# Patient Record
Sex: FEMALE
[De-identification: names, ages, dates, MRNs, and addresses within clinical notes are randomized per-mention and may not be internally consistent; named-entity substitution may affect disease eponyms.]

---

## 2018-04-08 PROCEDURE — 30233N1 TRANSFUSION OF NONAUTOLOGOUS RED BLOOD CELLS INTO PERIPHERAL VEIN, PERCUTANEOUS APPROACH: ICD-10-PCS | Performed by: FAMILY MEDICINE

## 2018-04-09 ENCOUNTER — HOSPITAL ENCOUNTER (INPATIENT)
Dept: HOSPITAL 95 - ER | Age: 78
LOS: 1 days | Discharge: ANOTHER HOSPITAL | DRG: 683 | End: 2018-04-10
Attending: INTERNAL MEDICINE | Admitting: INTERNAL MEDICINE
Payer: OTHER GOVERNMENT

## 2018-04-09 VITALS — WEIGHT: 166.01 LBS | BODY MASS INDEX: 28.34 KG/M2 | HEIGHT: 64.02 IN

## 2018-04-09 DIAGNOSIS — E87.5: ICD-10-CM

## 2018-04-09 DIAGNOSIS — E79.0: ICD-10-CM

## 2018-04-09 DIAGNOSIS — Z66: ICD-10-CM

## 2018-04-09 DIAGNOSIS — D70.1: ICD-10-CM

## 2018-04-09 DIAGNOSIS — C91.10: ICD-10-CM

## 2018-04-09 DIAGNOSIS — E88.3: Primary | ICD-10-CM

## 2018-04-09 DIAGNOSIS — D63.0: ICD-10-CM

## 2018-04-09 LAB
BASOPHILS # BLD: 0 K/MM3 (ref 0–0.23)
BASOPHILS NFR BLD: 0 % (ref 0–2)
CK MB CFR SERPL CALC: 1 % (ref 0–4)
CK SERPL-CCNC: 79 U/L (ref 26–193)
DEPRECATED RDW RBC AUTO: 70.3 FL (ref 35.1–46.3)
EOSINOPHIL # BLD: 0 K/MM3 (ref 0–0.68)
EOSINOPHIL NFR BLD: 0 % (ref 0–6)
ERYTHROCYTE [DISTWIDTH] IN BLOOD BY AUTOMATED COUNT: 21.3 % (ref 11.7–14.2)
HCT VFR BLD AUTO: 14.1 % (ref 33–51)
HGB BLD-MCNC: 4.4 G/DL (ref 11.5–16)
LDH SERPL-CCNC: 319 U/L (ref 100–240)
LYMPHOCYTES # BLD: 0.83 K/MM3 (ref 0.84–5.2)
LYMPHOCYTES NFR BLD: 66 % (ref 21–46)
MCHC RBC AUTO-ENTMCNC: 31.2 G/DL (ref 31.5–36.5)
MCV RBC AUTO: 90 FL (ref 80–100)
MONOCYTES # BLD: 0.03 K/MM3 (ref 0.16–1.47)
MONOCYTES NFR BLD: 3 % (ref 4–13)
NEUTS SEG # BLD MANUAL: 0.39 K/MM3 (ref 1.96–9.15)
NEUTS SEG NFR BLD MANUAL: 31 % (ref 41–73)
NRBC # BLD AUTO: 0 K/MM3 (ref 0–0.02)
NRBC BLD AUTO-RTO: 0 /100 WBC (ref 0–0.2)
PLATELET # BLD AUTO: 13 K/MM3 (ref 150–400)
PROTHROMBIN TIME: 11.3 SEC (ref 9.7–11.5)
TOTAL CELLS COUNTED BLD: 100
TROPONIN I SERPL-MCNC: <0.015 NG/ML (ref 0–0.04)
URATE SERPL-MCNC: 41.8 MG/DL (ref 2.6–6)

## 2018-04-09 PROCEDURE — P9016 RBC LEUKOCYTES REDUCED: HCPCS

## 2018-04-09 PROCEDURE — C9113 INJ PANTOPRAZOLE SODIUM, VIA: HCPCS

## 2018-04-10 LAB
ANION GAP SERPL CALCULATED.4IONS-SCNC: 16 MMOL/L (ref 6–16)
BUN SERPL-MCNC: 131 MG/DL (ref 8–24)
CA-I BLD-SCNC: 0.9 MMOL/L (ref 1.1–1.46)
CALCIUM SERPL-MCNC: 6.3 MG/DL (ref 8.5–10.1)
CHLORIDE BLD-SCNC: 103 MMOL/L (ref 98–108)
CHLORIDE SERPL-SCNC: 104 MMOL/L (ref 98–108)
CO2 BLD-SCNC: 14 MMOL/L (ref 21–32)
CO2 SERPL-SCNC: 12 MMOL/L (ref 21–32)
CREAT BLD-MCNC: 8.1 MG/DL (ref 0.6–1)
CREAT SERPL-MCNC: 6.83 MG/DL (ref 0.4–1)
GLUCOSE BLDC GLUCOMTR-MCNC: 107 MG/DL (ref 70–99)
GLUCOSE SERPL-MCNC: 197 MG/DL (ref 70–99)
HCT VFR BLD CALC: < 15 % (ref 36–46)
HGB BLD CALC-MCNC: (no result) G/DL (ref 12–16)
POTASSIUM BLD-SCNC: 6.1 MMOL/L (ref 3.5–5.5)
POTASSIUM SERPL-SCNC: 5.9 MMOL/L (ref 3.5–5.5)
SODIUM BLD-SCNC: 132 MMOL/L (ref 135–148)
SODIUM SERPL-SCNC: 132 MMOL/L (ref 136–145)

## 2021-05-15 NOTE — NUR
PHYSICIAN COMMUNICATION
CONTACTED ON CALL PHYSICIAN TO NOTIFY HIM THAT THE PATIENT HAD CRITICALLY LOW
HEMEGLOBIN OF 5.1 AND HEMATOCRIT OF 16.5. DR LANCASTER ORDERED ONE UNIT OF RED
BLOOD CELLS. ALSO NOTIFIED HIM THAT HER PLATELET COUNT IS 36. NO ORDERS GIVEN
FOR THIS.

## 2021-05-15 NOTE — NUR
SHIFT SUMMARY
PATIENT ALERT AND ORIENTED. HAD NO COMPLAINTS OF PAIN OR SHORTNESS OF BREATH.
SLEPT WELL OVERNIGHT AND HAD MINIMAL NEEDS. IV PATENT AND INFUSING. BED IN
LOWEST POSITION WITH WHEELS LOCKED AND ALARM ON. CALL LIGHT WITHIN REACH.
REPORT GIVEN TO ONCOMING RN.

## 2021-05-15 NOTE — NUR
PT AOX4 AND COOPERATIVE OF CARE. PT HAS BEEN TREATED FOR LOW GRADE FEVER AND
IS A ONE PERSON ASSIST TO RESTROOM OR BEDSIDE COMMODE. PT HAS HAD DIRRHEA AND
SOME INCONTENCE PULL UPS IN PLACE. 1 UNIT OF BLOOD ADMINISTERED TO DAY AND PT
TOLERATED WELL. CALL LIGHT IS WITHIN REACH WILL CONTINUE TO MONITOR.

## 2021-05-16 NOTE — NUR
SHIFT SUMMARY
PATIENT HAD NO ACUTE CHANGES OBSERVED. AXOX 3 AND ONE ASSIST TO BSC. ON 4L O2
NC. VSS/AFEBRILE. DENIES PAIN, SOB, AND N/V. PIV REMAINS INTACT. TEMPERATURE
REDUCED TO 97.3 FROM 100.3 ON DAY SHIFT. DROPLET PRECAUTIONS. CALL LIGHT IN
REACH. BED IN LOWEST POSITION. WILL CONTINUE TO MONITOR UNTIL DAY SHIFT NURSE
ASSUMES CARE.

## 2021-05-16 NOTE — NUR
PT AOX4 AND COOPERATIVE OF CARE. PT HAS HAD MILD FEVER AND HAS BEEN TREATED
PER EMAR. PT HAD DIARHEA STILL AT START OF SHIFT AND DR REDDY NOTIFIED AND
ADDED MEDICATION TO EMAR. PT CALLS APPROPRIATELY AND IS A STANDBY TO BEDSIDE
COMMODE. PT VERY TIRED AND IS RESTING IN BED AT THIS TIME. CALL LIGHT IS
WITHIN REACH WILL CONTINUE TO MONITOR.

## 2021-05-17 NOTE — NUR
SHIFT SUMMARY
AOX4. SPO2 DROPS WHEN NC TAKEN OFF. FOUND NC OFF 2X & SPO2 @74%, DENIES SOB.
PLACED NC & SPO2 INCREASED TO 88%, HAD PT PRONE & INCREASED O2 TO 5L, SPO2
@93%. HAS FREQUENT DRY NONPRODUCTIVE COUGH. LUNGS DIM IN BASES. REST OF VITALS
STABLE. REPORTS 8/10 ACHY PAIN ALLOVER BODY & IN JOINTS, MEDICATED 1X c
TYLENOL. DENIES N/V. NO DIARRHEA THIS SHIFT. IND TO BSC. CALL LIGHT IN REACH.
PT ABLE TO MAKE NEEDS KNOWN. WILL MONITOR.

## 2021-05-17 NOTE — NUR
PT AOX4 AND COOPERATIVE OF CARE. PT IS VERY MUCH IMPROVED FROM THE LAST TWO
DAYS. PT STILL NEEDS TO USE BEDSIDE COMMODE,BUT IS FEELING STRONGER AND WAS
ABLE TO EAT BETTER AND STATED SHE FELT BETTER. PT USES CALL LIGHT
APPROPRIATELY. PT STEADIER ON HER FEET TODAY. O2 HAS BEEN STABLE AROUND 95% ON
4L. WILL CONTINUE TO MONITOR.

## 2021-05-18 NOTE — NUR
ATTEMPTED TO ALERT  OF TRENDING DOWNWARD PLATELET NOW 46 W/HGB NOW
6.9. HE DIDN'T RETURN CALL PRIOR TO RN SHIFT REPORT SO DAY RN MADE AWARE AND
CHARGE RN, KRISTIAN SHARIF ALERTED AS WELL. WILL ENSURE DAY RN FOLLOWS UP.

## 2021-05-18 NOTE — NUR
SUMMARY: PT A/OX4, COOPERATIVE W/CARE AND CALLS APPROPRIATELY FOR ASSIST. SHE
OCCASSIONALLY YELLS INTO ANDERSON, MOANS AND GROANS BUT DENIES PAIN AND CLAIMS
"IT MAKES HER FEEL BETTER TO DO SO". SHE REMAINS DIAPHORETIC BUT AFEBRILE AND
CONT'S TO HAVE DIARRHEA. [place] [facility] [place] SHE ADMITS TO IMPROVED
BALANCE AND BEING "STEADIER ON FEET". SHE REMAINS ON 4L HUMIDIFIED O2 W/CONT
BIOX INTACT AND SPO2 WNL, RESPS E/U. PT SL BWTN IV ABX AND WILL RECIEVE DOSE
4 OF REMDESIVIR TODAY. NO ACUTE CHANGES, VSS/AFEBRILE. WCTM/REPORT TO DAY RN.

## 2021-05-18 NOTE — NUR
PT HAS RESTED THIS SHIFT. O2 REQUIREMENTS HAVE REMAINED THE SAME AT 5 L. SHE
IS INDEPENDENT IN THE ROOM AND AMBULATES TO THE RESTROOM AS NEEDED. SHE WAS
ABLE TO GET IN SHOWER THIS SHIFT WITH STAFF PRESENT IN ROOM AND ASSISTING AS
NEEDED. NO COMPLAINTS THIS TIME, CALL LIGHT WITHIN REACH.

## 2021-05-19 NOTE — NUR
SUMMARY: PT A/OX4, CALLS APPROPRIATELY AND SPECIFIES NEEDS. SHE REMAINS ON 5L
O2 W/SPO2 WNL AND RESPS E/U, MILD SOB NOTED W/EXERTION. SHE'S INDEPENDENT TO
BSC AND DIDN'T HAVE ANY EPISODES DIARRHEA THIS SHIFT. PT SL AFTER IV ABX. NO
PAIN, FEVERS OR COMPLAINTS AND SLEPT MAJORITY OF NOCTE. VSS/AFEBRILE, NO ACUTE
CHANGES. WCTM AND REPORT TO DAY RN.

## 2021-05-19 NOTE — NUR
SHIFT SUMMARY
 
PATIENT ALERT AND ORIENTATED X4. PATIENT CALLS APPROPRIATELY FOR SPECIFIED
NEEDS. PATIENT REMAINS ON 5L 02. PATIENT SATTING IN THE LOW TO MID 90S ON 5L.
PATIENT DOES NOT COMPLAIN OF PAIN, NAUSEA, FEVER. PATIENT IS INDEPENDENT IN
ROOM. PATIENT STATES NO EPISODES OF DIARRHEA AND BOWEL MOVEMENTS ARE
FORMING. PATIENT COOPERATIVE WITH CARE. VITAL SIGNS REVIEWED. NO ACUTE CHANGES
DURING SHIFT.

## 2021-05-19 NOTE — NUR
NURSING STUDENT DOC REVIEW
THIS RN ASSESSED THE PT. THIS RN REVIEWED & AGREES WITH NURSING STUDENT'S
ASSESSMENT AND DOCUMENTATION FOR THE SHIFT.

## 2021-05-20 NOTE — NUR
NURSING STUDENT DOC REVIEW
THIS RN ASSESSED THE PT. THIS RN REVIEWED & AGREES WITH THE STUDENT NURSE'S
DOCUMENTATION FOR THIS SHIFT.

## 2021-05-20 NOTE — NUR
SHIFT SUMMARY
 
PT IS A/0X4. PATIENT CALLS APPROPRIATELY AND STATES NEEDS. PATIENT IS
INDEPENDENT IN ROOM. PATIENT IS STILL SATTING IN THE LOW 90S ON 5L 02. PATIENT
HAD MODERATE BOWEL MOVEMENT IN COMMODE, NO DIARREA DURING THIS SHIFT. PATIENT
HAS HAD IV REMOVED. NO COMPLAINTS OF PAIN, FEVER. NO ACUTE CHANGES DURING
SHIFT. VITAL SIGNS REVIEWED. CALL LIGHT IN REACH.

## 2021-05-20 NOTE — NUR
PT HAD BEEN INCREASED TO 11 L O2 VIA OXYMIZER AROUND 2100. PT DENIED SOB BUT
WAS DESATTING IN THE LOW 80'S. DENIES DIZZINIESS.  RT CAME AND EVALUATED PT.
CURRENTLY ASLEEP SATTING AT 99% WITH CONTINOUS OX IN PLACE. CALL LIGHT WITHIN
REACH.

## 2021-05-21 NOTE — NUR
SHIFT SUMMARY
PT IS A&O AND CALLS APPROPRIATELY.  PT O2 SATURATIONS RANGES FROM 89 TO 97%
DEPENDING ON PT LEVEL OF EXERTION.  WHEN PT GETS OUT OF BED TO USE BSC 15L IN
A NON-REBREATHER USED CONCURENTLY TO MAINTIN PT SATURATIONS.  PT DENIED P/N/V
DURING SHIFT.  CNA STATED THAT PT BECAME AGITATED WHEN HOSPICE INFORMATION WAS
ON HER MEAL TRAY.  PT CURENTLY RESTING AND HOPING TO GET SOME SLEEP TONIGHT.
CALL LIGHT W/IN REACH.

## 2021-05-21 NOTE — NUR
NIGHT SHIFT SUMMARY
 PT A/O X4. PT HAS INCREASED FROM 5L O2 VIA NC AT BEGINNING OF SHIFT TO BEING
ON 15L VIA HIGH FLOW. PT USES BSC AND DESATS TO THE MID 80'S AFTER GETTING UP.
IT TAKES ABOUT 10 MIN FOR SATS TO COME BACK UP. RT MADE KNOWN AND RECOMMENDED
TO PUT A NON-REBREATHER MASK OVER THE HIGH FLOW O2 WHILE PT IS UP IN ORDER TO
HELP SATS INCREASE QUICKER. PT IS IN BED AND CURRENTLY SATTING IN THE LOW TO
MID 90'S. PT DENIED SOB OVERNIGHT EVEN WITH O2 DESATS. DENIES PAIN, NAUSEA,
DIZZINIESS. PT HAS BEEN ANNOYED AND THINKS OXYGEN EQUIPMENT IS MALFUNCTIONING.
PT STATES SHE COULD NOT TOLERATE BEING PRONE. CALL LIGHT WITHIN REACH, WILL
CONTINUE TO MONITOR.

## 2021-05-21 NOTE — NUR
HIGH FLOW
2300 PT GOT UP TO USE BSC AND DESATTED TO THE LOW TO MID 80'S. SAT HAS NOT
IMPROVED DESIPITE BEING BACK IN BED FOR SOME TIME. PT WILL NOT TOLERATE BEING
PRONE.  PLACED ON HIGH FLOW AND INCREASED TO 15L SATTING IN THE LOW 90'S.
WILL CONTINUE TO MONITOR.

## 2021-05-22 NOTE — NUR
Reshma has slept well overnight.  No complaints of pain or discomfort.  Still
quite sob with very minimal exertion.  Respiratory care decreased her high
flow as she was satting high 90's most of the night, However, 02 needs
increased once she fell asleep and oxygen was again turned up to 15 liters.
Large BM tonight on the commode.

## 2021-05-22 NOTE — NUR
SHIFT SUMMARY
PT A&O AND ABLE TO MAKE NEEDS KNOWN. IN MORING HELPED PT TO BSC, PT
SATURATIONS DROP IN TO HIGH 70'S LOW 80'S AND TOOK OVER 10MINUTES TO RECOVER.
PT REMAINED ASYMPTOMATIC DURING THAT TIME.  NEW IV WAS STARTED BY GERMAINE KRISHNAN
AND BLOOD TRANSFUSION ADMINISTERED.  IN AFTERNOON PT O2 SATURATIONS REMAINED
IN HIGH 90'S AND TITRATED NC DOWN TO 13L, PT LEVELS REMAIN STABLE.  PT
REQUESTED VISIT FROM PASTORAL STAFF, THEY ARE NOT HERE IN AFTERNOON WILL PASS
REQUEST ON TO NIGHT SHIFT.  PT CURENTLY EATING DINNER, CALL LIGHT W/IN REACH.

## 2021-05-22 NOTE — NUR
Patient continuous 02 sat monitor began consistantly alarming for saturations
in the mid 80's.  She stated she had been sitting up in bed scratching her
leg and that she didn't feel SOB.  Resp therapy consulted, and it was
recommended that we increase her high flow O2 back to 15 LPM which was done.
Reshma is satting in mid 90's at this time.

## 2021-05-23 NOTE — NUR
Shift Summary
A/Ox3, pleasant and cooperative. Up to bedside commode with 1p SBA. Titrated
O2 down, currently on 9L O2 via HF NC. Sats > 95% via biox. Denies shortness
of breath. Appetite is good eating at least half of all meals and also asking
for snacks in between meals. Patient refuses to accept positive stool occult
result and asking for a repeat. Dr. Rodriguez is aware. Patient optimistic about
improving so she can "go back to my people and do my duty" (cooking and
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 Ordered Ocean Spray for dry nose. Humidifier
attached to NC to aid in moisturizing nasal passage. Denies pain, nausea,
vomiting. Lung sounds are clear. WCTM.

## 2021-05-23 NOTE — NUR
NIGHT SHIFT SUMMARY
This patient had no complaints of discomfort overnight.  02 decreased to
11 liters via high flow at rest with saturations in low to mid 90's.  Patient
remains alert and oriented X4, pleasant and cooperative with care.
Stool was sent to lab for guiac.

## 2021-05-23 NOTE — NUR
OCEAN SPRAY
C/O DRY NOSE D/T OXYGEN SUPPLEMENT VIA HF NC @ 10-11 LPM. V.O. FROM DR. BARRERA
TO GIVEN OCEAN SPRAY Q1H PRN FOR DRY NOSE. EMAR UPDATED.

## 2021-05-24 NOTE — NUR
NIGHT SHIFT SUMMARY
Reshma had another night of improvement in her 02 saturation.  She is currently
down to 7L 02 via high flow from 9L at the beginning of the shift.  No
complaints of discomfort and she was able to get oob to bedside commode
independently without requiring the non rebreather mask.  Each time she would
get up, it would drop her Sats down to mid 80's and take approximately 10
minutes to recover and be consistantly in the 90-95% range.
Hoping to increase her endurance to make it to the "Actual bathroom" by mid
week.

## 2021-05-24 NOTE — NUR
assumed care of pt following receiving report from previous RN Elmer. Pt a/o x
4, pleasant/cooperative, sitting up in bed watching TV. Denies pain

## 2021-05-25 NOTE — NUR
SHIFT SUMMARY
ASSUMED CARE OF PT AT 1900. PT IS A/OX4. HEART SOUNDS REGULAR, LUNG SOUNDS
HAVE CRACKLES AT THE BASES. P IS ON 6L NC. SATURATIONS REMAIN ABOVE 90%. PT IS
INDEPENDENT TO BSC. PT HAS NO NEW COMPLAINTS.
 
CALL LIGHT IN REACH, BED IN LOWEST POSITON.

## 2021-05-25 NOTE — NUR
SHIFT SUMMARY:
NO ACUTE CHANGES TO REPORT THIS SHIFT. PT A&O; CALM AND COOPERATIVE WITH CARE;
INDEPENDENT IN ROOM. MEDICATED FOR SHOULDER PAIN PER EMAR. O2 @ 7L HIGH FLOW;
WIRELESS OXIMETRY IN PLACE. STEROIDS CONTINUING. REPORT GIVEN TO ONCOMING RN.

## 2021-05-25 NOTE — NUR
Patient is lying bed and alert. Patient tells me that she was hoping a
 would visit her today. She then asks if I would say a prayer for her.
I gladly provide prayer. The patient then talks about how silvino is at work to
harm God's people but settles on the thought that she knows that the Bible
tells us that we will be victorious in the end. I will continue to remain
available to patient and family.

## 2021-05-26 NOTE — NUR
NIGHT SHIFT SUMMARY
 PT A/O X4. SLEPT WELL OVERNIGHT. PT STARTED THE SHIFT ON 8-9L AND HAS BEEN
TITRATED DOWN TO 5L VIA HIGH FLOW SATTING IN THE HIGH 90'S. PT DENIES SOB.
SLIGHTLY ELEVATED TEMP THIS MORNING. DENIES CHILLS. BLANKET TAKEN OFF, COOL
WASHCLOTH APPLIED TO FOREHEAD. PT STATES SHE FEELS MUCH BETTER THIS AM AND
"READY TO WALK OUT OF HERE". DENIES PAIN, DIZZNIESS. INDEPENDENT TO BSC. USES
CALL LIGHT APPROPRIATELY.

## 2021-05-26 NOTE — NUR
PT IS A/O X4, CONTINENT, INDEPENDENT IN ROOM, USES BSC, AND CALLS
APPROPRIATELY. PT HAD A SLIGHTLY ELEVATED TEMP THIS AM BUT SHORTLY AFTER
MEDICATION THE FEVER BROKE. THE PT DENIES SOB, BUT DOES COUGH WITH DEEP
INSPIRATION. THE PT WAS WEANED OFF 5L OF HIGH FLOW O2 DOWN TO 1L THIS PM, SATS
ARE IN THE HIGH 90'S. THE PLAN IS TO D/C THE PT HOME TOMORROW.

## 2021-05-27 NOTE — NUR
HGB THIS AM WAS 6.7. PLT ALSO CONTINUED TO BE CRITICALLY LOW AT 45. NOTFIED
DR. BRANDT. NEW ORDERS TO TRANSFUSE 1 UNIT OF PRBC'S.

## 2021-05-27 NOTE — NUR
SHIFT SUMMARY
PT SLEPT WELL THIS EVENING. O2 REQUIREMENTS RANGING FROM 1-3 L. PT MOSTLY
REMAINED ON 1.5 L THROUGHOUT THE NIGHT. PT'S TEMPERATURE 99.7 AT START OF
SHIFT BUT DECREASED TO 98.9 THIS AM WITHOUT MEDICATION. NO COMPLAINTS OF PAIN
OR DISCOMFORT FROM PT. NO ACUTE CHANGES THIS SHIFT. VITAL SIGNS STABLE. PT
HOPEFUL TO DISCHARGE HOME TODAY.

## 2021-05-27 NOTE — NUR
PATIENT A/OX4, UP INDEPENDENTLY IN ROOM. FEVER THIS AFTERNOON .5,
MEDICATED WITH TYLENOL AND IS NOW WNL. 1 UNIT OF BLOOD GIVEN THIS AM FOR HGB
6.7. 2LO2 VIA NC THROUGHOUT THE SHIFT, LUNGS CLEAR. CONTINUES TO HAVE A COUGH
WITH SCANT AMOUNT OF CLEAR SPUTUM. 20G IV TO R FA WNL AND SL. PLANS TO DC TO
HOME WHEN STABLE.

## 2021-05-28 NOTE — NUR
SHIFT SUMMARY
PT A/O X4; PLEASANT AND COOPERATIVE WITH CARE. NO ACUTE CHANGES THIS SHIFT.
BEEING SLEEPING ON AND OFF ALL SHIFT. CURRENTLY SATTING IN THE LOW TO MID 90'S
WHILE ON 3.5 LITERS O2. WILL POSSIBLY DC TOMORROW AFTER HAVING REPEAT LABS IN
THE AM. VSS.

## 2021-05-28 NOTE — NUR
SHIFT SUMMARY
PT HAD AN UNEVENTFUL NIGHT. SLEPT OFF AND ON THROUGHOUT THE NIGHT. DENIES ANY
SOB. PT ON 2-3 L VIA NC WITH O2 SATS IN THE LOW 90'S. VITAL SIGNS STABLE. PT
REMAINED AFEBRILE. PT HOPEFUL TO DISCHARGE HOME TODAY.

## 2021-05-29 NOTE — NUR
SHIFT SUMMARY
PATIENT HAD NO ACUTE CHANGES OBSERVED. AXOX 4 AND INDEPENDENT IN ROOM. ON 3.5L
O2 NC STATING LOW TO MID 90'S IN CONTINUOUS PULSE OXIMETRY, DENIES PAIN, SOB,
AND N/V. Knik. PIV REMAINS INTACT. DROPLET PRECAUTIONS. CALL LIGHT IN REACH.
BED IN LOWEST POSITION. WILL CONTINUE TO MONITOR UNTIL DAY SHIFT NURSE ASSUMES
CARE.

## 2021-05-29 NOTE — NUR
SHIFT SUMMARY
NO ACUTE CHANGES TO PRESENT THIS SHIFT. PT UP INDEPENDENTLY AROUND RM AND TO
BTHRM. DR BELLAMY IN TO SEE PT THIS AM. PT DECLINING TO GO HOME. REPORTS THAT
SHE LIVES ALONE AND UNSURE IF SHE CAN MANAGE BY HERSELF YET. PT TO HAVE HOME
O2 EVAL PRIOR TO D/C. NO C/O PAIN OR SOB. REMAINS ON 2.5L O2, WITH SATS IN MID
90"S. CALL LT IN REACH. ABLE TO MAKE NEEDS KNOWN.

## 2021-05-30 NOTE — NUR
SHIFT SUMMARY
PATIENT REPORTED HA X ONE AND TYLENOL 650 MG GIVEN PER EMAR. STATING INTO THE
LOW 80'S ON 2.5 L O2 AND NOW 4.5L O2 NC AND STATING LOW TO MID 90'S. ON
CONTINUOUS PULSE OXIMETRY. AXOX 4 AND INDEPENDENT IN ROOM. VSS/AFEBRILE.
DENIES SOB AND N/V. DROPLET PRECAUTIONS. CALL LIGHT IN REACH. BED IN LOWEST
POSITION. WILL CONTINUE TO MONITOR UNTIL DAY SHIFT NURSE ASSUMES CARE.

## 2021-05-30 NOTE — NUR
SHIFT SUMMARY:
 
PT CURRENTLY USING 3L O2 VIA NC. PLAN IS FOR HOME DC LIKELY TUESDAY WITH HOME
HEALTH, WHEN HOME HELATH IS AVAILABLE TO COME ASSIST SAME DAY. PT IS
INDEPENDENT IN ROOM. DENIES ANY NEEDS OR CONCERNS AT THIS TIME.

## 2021-05-31 NOTE — NUR
SHIFT SUMMARY
 
PT IS AOX4. PT DENIES PAIN, N/V, SOB. PT IS ONE PERSON ASSIST TO SBA IN ROOM.
PT EXHIBITED EPISODE OF DESATURATION THIS AM AND WAS BUMPED TO 11 L 02 AND
PRONED-SEE NOTE. PT IS NOW BACK AT 6 L 02 VIA NC AND HUMIDIFIER. PT RECEIVED
ONE UNIT PRBC PER ORDERS. PT HAD A CHEST XRAY THIS JANNET. PT'S APPETITE IS GOOD.
PLAN IS FOR POTENTIAL DC TOMORROW ON HOSPICE IF PT IS AMENABLE TO THIS. PT DID
NOT HAVE VISITORS PER COVID PROTOCOL. ENHANCED ISOLATION PRECUATIONS
MAINTAINED T/O SHIFT. PT IS IN ROOM, CALL LIGHT IN REACH, BED IN LOW POSITION.

## 2021-05-31 NOTE — NUR
SHIFT SUMMARY
PATIENT HAD NO ACUTE CHANGES OBSERVED. AXOX 4 AND INDEPENDENT IN ROOM. ON 5L
O2 NC STATING 88%-97; DROPPING DOWN TO 84% ON 3.5-4L. ON CONTINUOUS
PULSE OXIMETRY. DENIES PAIN, SOB, AND N/V. VSS/AFEBRILE. DROPLET PRECAUTIONS.
PIV REMAINS INTACT. CALL LIGHT IN REACH. BED IN LOWEST POSITION. WILL CONTINUE
TO MONITOR UNTIL DAY SHIFT NURSE ASSUMES CARE.

## 2021-05-31 NOTE — NUR
PT OXYGEN STATUS
 
PT O2 DROPPED TO 82% ON 6 L 02 THIS AM. PT WAS LAYING IN BED. THIS RN BUMPED
02 TO 7 L AND SATS WERE 84-87%. PT INSTRUCTED TO PRONE AND INITIALLY REFUSED.
THIS RN EXPLAINED REASONING AND PT EVENTUALLY PRONED FOR ABOUT 1-2 MINUTES AND
SATS WERE 94-95%. PT REMAINS ON 7 L 02. THIS RN WILL CONTINUE TO MONITOR PT
STATUS.

## 2021-06-01 NOTE — NUR
Pt on covid isolation. Pt stating she wants to go home. She is very fearful
she is dying from covid and angry. Pt wavering hon home health and hospice. We
slowly and carefully discussed a safe dischareg plan. Pt though home health
would be with her most the time and she would have rehab care. Advised that
insurance does not pay for caregiver hours. pt is  but not service
connected. She is trying to get service connected status. Advised will see
what benefits available.
   Pt wants todie at home and is anxious angry and fearful. Offered strategy
of speaking with her oncology team to help with a plan. Called her oncolgist
and they believe she would benefit more from hospice care due to the risk for
increased symptoms and high risk for readmission.
   Pt called friends and family to come stay with her. advised pt of what her
doctor reccomended. gave her choices on weather to go with home health or
hospice. she chose hospice. Understanding now is that she is wavering. Will
revisit patient and attempt to help her work out her needs and feelings.Will
try again to get her the level of help she needs.

## 2021-06-01 NOTE — NUR
PATIENT CONTINUES ON AIRBORNE ISOLATION FOR PNEUMONIA R/T COVID. PATIENT IS ON
12L O2 NC AND ON CONT BIOX. PATIENT IS UP INDEPENDENTLY IN ROOM. SHE BECOMES
EASILY IRRITABLE AND PREFERS TO BE LEFT ALONE. VITALS HAVE BEEN STABLE. PLAN
AT THIS POINT IS FOR PATIENT TO D/C HOME WITH HOSPICE TOMORROW. CALL LIGHT
WITHIN REACH.

## 2021-06-01 NOTE — NUR
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
continues to require high flow oxygen for keep sats greater than 90%. 87 to 89
% on 8 l high flow. PT unwilling to prone or lay on side, discussed with RT n
ext step would be AIRVO or bipap. But PT maintains sat 93% on 11 l high flow
humidified. PT is irritable does not like to have lights or blankets on. Had
pallative care consult yesterday to discuss dc plan possible dc soon on
Hospice? Has CLL & She has required several transfusions since admission to
treat HBG less than 8. Having critically low platelets were 45 yesterday up
from 40 the day prior & up from 36 on 05/15/21. Has Sister Adenike who should
be involved in DC plan. Up with SBA to bathroom. PT says bowels moving 3 x
daily denies diarrhea. Irritated by Bioxx alarms. Continues in enhanced
isolation for covid 19. Called DR Rojo to discuss increased oxygen demands
from 7 l high flow to 11 l high flow & RT recommendations for AIRVO if PT
required more than 11l high flow.
+

## 2021-06-01 NOTE — NUR
recieved report on [facility] PT with CLL and covid 19 pneumonia on 10 l high flow
oxygen sat 95% per [place] monitoroutside room . PT & Sister involved
in DC plan to dc home on Hospice tomorrow with [facility] services. Continues in
enhanced droplet contact isolation for covid 19 pneumonia.

## 2021-06-01 NOTE — NUR
PT continues to need 11 l high flow oxygen to keep sats greater than 90%. She
has pallative care consult ordered.

## 2021-06-02 NOTE — NUR
DISCHARGE:
 
PT'S IV DC'D WNL. PT DISCHARGED ON 12L O2 AND TRANSFERRED VIA WC BY AMBULANCE
 WITH ASSISTANCE BY CNA. PT DENIED QUESTIONS OR CONCERNS. HOSPICE AWARE
OF PT COMING HOME.

## 2021-06-02 NOTE — NUR
RT AT BEDSIDE AT THIS TIME. PT ON 11L NC SATTING HIGH 80S TO LOW 90S. DR BELLAMY AWARE THAT ARRANGEMENTS MADE FOR DC AT 1030. CHRISTY ADORNO WORKING ON
MEDS FOR DC AND STATES RIDE TIME CHANGED TO 11. NO ACUTE NEEDS AT THIS TIME.

## 2022-09-17 NOTE — NUR
PT REPORTS "LABORED" BREATHING T/O THE DAY THAT SEEMS TO WAX AND WANE. SHE WAS
ABLE TO TOLERATE 6L NASAL CANNULA THIS AFTERNOON FOR A FEW HOURS AND WAS ABLE
TO EAT LUNCH WITHOUT ISSUES. PT WITH CONTINUED FEVER, PT REPORTS THAT DUE TO
HER CLL SHE OFTEN "RUNS HOT" AND WAS RELUCTANT TAKE TYLENOL FOR TEMPERATURE OF
100.0, WHEN FEVER CLIMBED .0 PT DID THEN AGREE TO TAKE TYLENOL FOR FEVER
AND RETURN TO CPAP, WHICH IS ALSO IMPROVED TACHYCARDIA AND ANXIETY FOR
PATIENT. PT HAS BEEN UP TO BSC NUMEROUS TIMES THIS SHIFT, PT DID REPORT TO PCT
THAT SHE NO LONGER PLANS TO GET UP TO BSC AS SHE NOW PLANS TO USE HER ATTENDS
FOR TOILETING WHICH WAS NOT ENCOURAGED, PT CONTINUES TO GET UP TO BSC. PT A/O
X4, ABLE TO MAKE NEEDS KNOWN, USES CALL LIGHT APPROPRIATELY. SHE REMAINS ON
CPAP AT THIS TIME

## 2022-09-17 NOTE — NUR
ARRIVAL TO PCU/SHIFT SUMMARY
 
PT ARRIVED TO PCU 14 AT APPROXIMATELY 2300. PT WAS SLID OVER FROM [facility] TO
HOSPITAL BED BY 4 CLINICAL STAFF MEMBERS. PT ORIENTED TO ROOM AND HOW TO USE
CALL LIGHT. RT AT BEDSIDE, SET UP CPAP AND PLACED IT ON PT. PT A&Ox4, CALLS
AND COMMUNICATES NEEDS APPROPRIATELY. BP SOFT WITH SBP IN 90's, NOTIFIED DR. LANCASTER PRIOR TO 0000 LASIX ADMINISTRATION, MD WITH ORDERS TO HOLD. SINUS
TACH, 100-110's. SpO2> 92% ON CPAP SET TO 45% FiO2, RR ELEVATED IN THE 30's,
LUNG SOUNDS CLEAR IN APEXES, COARSE CRACKLES IN BASES.  PT STATED SOB
IMPROVING SINCE BEING PLACED ON CPAP. DENIES CP/PRESSURE.
 
NO ACUTE CHANGES SINCE ARRIVAL TO PCU 14. PT BP IMPROVED WITH SBP IN
120-130's. RR IMPROVED WITH RR NOW IN 20's. PT HAS BEEN ABLE TO REST
COMFORTABLY WITH THE HELP OF THE CPAP. PT TOLERATED TRANSFERING TO THE [facility]
WITH SBA, SpO2 REMAINED >92%. WILL CONTINUE TO MONITOR AND PROVIDE CARE UNTIL
REPORT TO DAY SHIFT RN.

## 2022-09-18 NOTE — NUR
PT ALERT, ATTEMPTING TO EXIT BED, VERY ANXIOUS STS SHE NEEDS TO BE "UP". PT
NOTED TO BE FEBRILE SHE IS TREATED WITH TYLENOL FOR FEVER, TREATED WITH ATIVAN
FOR ANXIET. SHE CONTINUES TO REFUSE AMLODIPINE. PT PLACED ON NASAL CANNULA FOR
MEDICATION ADMINISTRATION WHICH WAS TOLERATED WELL BUT PT VERY QUICKLY
FATIGUED AND IS PLACED BACK ON CPAP AND REPOSITIONED TO UPRIGHT POSITIONED. LS
ARE NOTED TO HAVE CRACKLES T/O.

## 2022-09-18 NOTE — NUR
Spoke with Dr Alejandro prior to Pt visit and discussed case. Pt and family may
benefit from goals of care discussion. Pt's respiratory status is declining
and Pt does not want to pursue treatment for her CLL.
 
Pt resting in bed wearing BIPAP. Pt A&OX3. Pt A&O to person, family, place,
and reson for hospital stay. Pt not able to verbalize appropriate year.
Engaged in therapeutic conversation regarding goals of care. Discussed options
including maintaining current plan of care and the option for comfort care.
Educated on comfort care philosophy with V/U made by Pt. Primary RN Nadege
into room to place Pt on NC. Confirmed with Pt wishes with Pt reporting wishes
are comfort care.
 
Called and spoke with Pt's sister Adenike. Provided update and discussed Pt's
wishes. Educated on comfort care philosophy. Facilitated video call with Pt's
phone to Pt's sisters. Family talks with Pt and at times are tearful. Family
appears to be in agreement with Pt's decision. Family agreeable to impliment
comfort care and sisters will attempt to fly in before Pt passes away.
 
Placed comfort care order, comfort care order set, D/C maintenance medications
per V/O from Dr Alejandro.
 
PPS 20%
ADLs 6/6
 
Palliative Care will remain available.

## 2022-09-18 NOTE — NUR
PT WAS PLACED ON TO COMFORT CARE, THIS RN WAS THERE TO CONFIRM PT'S WISHES TO
GO TO COMFORT CARE WITH PALLIATIVE CARE RN NILSA. PT REMAINS WITH CPAP IN
PLACE WHICH SHE IS TOLERATING VERY WELL. SHE IS MEDICATED FOR COMFORT
REGULARLY AND REPOSITIONED. VSS. FAMILY IS ARRIVING TO BE AT THE BEDSIDE.
COMFORT CART IN THE ROOM

## 2022-09-18 NOTE — NUR
SHIFT SUMMARY: PATIENT DENIES CHEST PAIN, REPORTS BASELINE TEMP OF
99.0-100.1*, AND BP WNL. PATIENT REPORTS SOME SOB WITH EXERTION, SATS >90% ON
CPAP 8L/70% FIO2. MEDICATED PER EMAR. PATIENT RESTLESS THROUGHOUT THE NIGHT.
MAKES NEEDS KNOWN. BED LOW WITH CALL LIGHT IN REACH. WILL CONTINUE TO MONITOR
AND REPORT TO ONCOMING RN.

## 2022-09-19 NOTE — NUR
FINAL DISCHARGE:
PT AGONAL BREATHING AT BEGINNING OF SHIFT. MEDICATED PER EMAR. PT 
@ W/FAMILY AT BEDSIDE. MD NOTIFIED.

## 2022-09-19 NOTE — NUR
CPAP WAS TAKEN OFF THIS MORNING, MEDICATED WITH MORPHINE SATS STAYED BETWEEN
70-80% O2 ON 6L OF O2 VIA NASAL CANNULA. FAMILY REQUESTED TO KEEP O2 CANNULA
ON UNTIL THE REST OF THE FAMILY MEMBER COMES DOWN FORM [place] THE TO START
TITRATING O2 UNTIL ROOM AIR. PT HAS TRANSITIONED MOSTLY NON VERBAL AT THIS
TIME WILL OPEN EYES TO STIMULATION, SHALLOW BREATHING. PT HAS BEEN
REPOSITIONED FOR COMFORT. WILL REPORT TO ONCOMING SHIFT

## 2022-09-19 NOTE — NUR
Comfort care visit
 
Stopped by to check in on Reshma.  She is currently sleeping and has the CPAP
machine on.  She appears to be comfortable at this time with no no verbal
indicators of pain or discomfort noted at this time.  Left pt undisturbed.

## 2022-09-19 NOTE — NUR
SHIFT SUMMARY:
PT REMAINS ON CPAP, DESATS QUICKLY WITH ANY ACTIVITY. PT OPENS EYES TO VERBAL
STIMULI BUT QUICKLY FALLS BACK ASLEEP. FAMILY IN THIS EVENING AND UPDATED ON
PT CARE.  REPOS Q2, HAD TWO INC VOIDS, NO BM. MEDICATED THROUGHOUT THE NIGHT
FOR PAIN AND AIR HUNGER, SEE EMAR. BED IN LOW, ALARM ON. WILL REPORT TO
ONCOMING RN.